# Patient Record
Sex: MALE | Race: OTHER
[De-identification: names, ages, dates, MRNs, and addresses within clinical notes are randomized per-mention and may not be internally consistent; named-entity substitution may affect disease eponyms.]

---

## 2019-12-06 ENCOUNTER — HOSPITAL ENCOUNTER (INPATIENT)
Dept: HOSPITAL 15 - ER | Age: 24
LOS: 1 days | Discharge: HOME | DRG: 245 | End: 2019-12-07
Attending: INTERNAL MEDICINE | Admitting: INTERNAL MEDICINE
Payer: MEDICAID

## 2019-12-06 VITALS — DIASTOLIC BLOOD PRESSURE: 59 MMHG | SYSTOLIC BLOOD PRESSURE: 94 MMHG

## 2019-12-06 VITALS — WEIGHT: 126.37 LBS | BODY MASS INDEX: 18.72 KG/M2 | HEIGHT: 69 IN

## 2019-12-06 DIAGNOSIS — D64.9: ICD-10-CM

## 2019-12-06 DIAGNOSIS — K51.911: Primary | ICD-10-CM

## 2019-12-06 DIAGNOSIS — Z83.3: ICD-10-CM

## 2019-12-06 DIAGNOSIS — E44.0: ICD-10-CM

## 2019-12-06 LAB
ALBUMIN SERPL-MCNC: 2.9 G/DL (ref 3.4–5)
ALP SERPL-CCNC: 76 U/L (ref 45–117)
ALT SERPL-CCNC: 11 U/L (ref 16–61)
ANION GAP SERPL CALCULATED.3IONS-SCNC: 5 MMOL/L (ref 5–15)
APTT PPP: 31.7 SEC (ref 23.64–32.05)
BILIRUB SERPL-MCNC: 0.3 MG/DL (ref 0.2–1)
BUN SERPL-MCNC: 11 MG/DL (ref 7–18)
BUN/CREAT SERPL: 12.5
CALCIUM SERPL-MCNC: 8.5 MG/DL (ref 8.5–10.1)
CHLORIDE SERPL-SCNC: 108 MMOL/L (ref 98–107)
CO2 SERPL-SCNC: 27 MMOL/L (ref 21–32)
GLUCOSE SERPL-MCNC: 88 MG/DL (ref 74–106)
HCT VFR BLD AUTO: 33.8 % (ref 41–53)
HGB BLD-MCNC: 10.6 G/DL (ref 13.5–17.5)
INR PPP: 1.19 (ref 0.9–1.15)
MCH RBC QN AUTO: 21.2 PG (ref 28–32)
MCV RBC AUTO: 67.3 FL (ref 80–100)
NRBC BLD QL AUTO: 0 %
POTASSIUM SERPL-SCNC: 4.6 MMOL/L (ref 3.5–5.1)
PROT SERPL-MCNC: 9.3 G/DL (ref 6.4–8.2)
SODIUM SERPL-SCNC: 140 MMOL/L (ref 136–145)

## 2019-12-06 PROCEDURE — 80053 COMPREHEN METABOLIC PANEL: CPT

## 2019-12-06 PROCEDURE — 86901 BLOOD TYPING SEROLOGIC RH(D): CPT

## 2019-12-06 PROCEDURE — 96375 TX/PRO/DX INJ NEW DRUG ADDON: CPT

## 2019-12-06 PROCEDURE — 36415 COLL VENOUS BLD VENIPUNCTURE: CPT

## 2019-12-06 PROCEDURE — 86900 BLOOD TYPING SEROLOGIC ABO: CPT

## 2019-12-06 PROCEDURE — 85610 PROTHROMBIN TIME: CPT

## 2019-12-06 PROCEDURE — 85018 HEMOGLOBIN: CPT

## 2019-12-06 PROCEDURE — 86850 RBC ANTIBODY SCREEN: CPT

## 2019-12-06 PROCEDURE — 99291 CRITICAL CARE FIRST HOUR: CPT

## 2019-12-06 PROCEDURE — 83605 ASSAY OF LACTIC ACID: CPT

## 2019-12-06 PROCEDURE — 85025 COMPLETE CBC W/AUTO DIFF WBC: CPT

## 2019-12-06 PROCEDURE — 74176 CT ABD & PELVIS W/O CONTRAST: CPT

## 2019-12-06 PROCEDURE — 85014 HEMATOCRIT: CPT

## 2019-12-06 PROCEDURE — 85045 AUTOMATED RETICULOCYTE COUNT: CPT

## 2019-12-06 PROCEDURE — 96361 HYDRATE IV INFUSION ADD-ON: CPT

## 2019-12-06 PROCEDURE — 85652 RBC SED RATE AUTOMATED: CPT

## 2019-12-06 PROCEDURE — 85730 THROMBOPLASTIN TIME PARTIAL: CPT

## 2019-12-06 PROCEDURE — 87040 BLOOD CULTURE FOR BACTERIA: CPT

## 2019-12-06 PROCEDURE — 96374 THER/PROPH/DIAG INJ IV PUSH: CPT

## 2019-12-06 RX ADMIN — HYDROCODONE BITARTRATE AND ACETAMINOPHEN PRN TAB: 10; 325 TABLET ORAL at 23:48

## 2019-12-06 RX ADMIN — FAMOTIDINE SCH MG: 20 TABLET, FILM COATED ORAL at 22:00

## 2019-12-06 RX ADMIN — METHYLPREDNISOLONE SODIUM SUCCINATE SCH MG: 40 INJECTION, POWDER, FOR SOLUTION INTRAMUSCULAR; INTRAVENOUS at 23:47

## 2019-12-06 RX ADMIN — SODIUM CHLORIDE SCH MLS/HR: 0.9 INJECTION, SOLUTION INTRAVENOUS at 18:45

## 2019-12-06 NOTE — NUR
Telemetry admit from [facility] admitted to Telemetry unit after SBAR received.  Patient oriented to BLAKE SOLITARIO, RN primary RN, [facility] unit, room 273, bed A, and unit policies regarding patient 
care and visiting hours. Patient now on continuous telemetry monitoring, tele box #[id number]  and 
telemetry reading on arrival to unit is Sinus Bradycardia @ 50. Patient weighed by bed scale 
and encouraged to call if they need something. All questions and concerns addressed, patient 
verbalized understanding. Note: Patient received Awake and alert. He denies abdominal pain 
and nausea and vomiting. Abdomen soft and non-distended. Bed in low position and call light 
in reach.

## 2019-12-07 VITALS — DIASTOLIC BLOOD PRESSURE: 46 MMHG | SYSTOLIC BLOOD PRESSURE: 96 MMHG

## 2019-12-07 VITALS — DIASTOLIC BLOOD PRESSURE: 57 MMHG | SYSTOLIC BLOOD PRESSURE: 102 MMHG

## 2019-12-07 VITALS — SYSTOLIC BLOOD PRESSURE: 109 MMHG | DIASTOLIC BLOOD PRESSURE: 73 MMHG

## 2019-12-07 LAB
HCT VFR BLD AUTO: 28.4 % (ref 41–53)
HCT VFR BLD AUTO: 28.9 % (ref 41–53)
HCT VFR BLD AUTO: 29.5 % (ref 41–53)
HGB BLD-MCNC: 9 G/DL (ref 13.5–17.5)
HGB BLD-MCNC: 9 G/DL (ref 13.5–17.5)
HGB BLD-MCNC: 9.3 G/DL (ref 13.5–17.5)
MCH RBC QN AUTO: 21.4 PG (ref 28–32)
MCV RBC AUTO: 67.6 FL (ref 80–100)
NRBC BLD QL AUTO: 0 %

## 2019-12-07 RX ADMIN — METHYLPREDNISOLONE SODIUM SUCCINATE SCH MG: 40 INJECTION, POWDER, FOR SOLUTION INTRAMUSCULAR; INTRAVENOUS at 14:33

## 2019-12-07 RX ADMIN — HYDROCODONE BITARTRATE AND ACETAMINOPHEN PRN TAB: 10; 325 TABLET ORAL at 07:09

## 2019-12-07 RX ADMIN — SODIUM CHLORIDE SCH MLS/HR: 0.9 INJECTION, SOLUTION INTRAVENOUS at 02:15

## 2019-12-07 RX ADMIN — METHYLPREDNISOLONE SODIUM SUCCINATE SCH MG: 40 INJECTION, POWDER, FOR SOLUTION INTRAMUSCULAR; INTRAVENOUS at 06:29

## 2019-12-07 RX ADMIN — SODIUM CHLORIDE SCH MLS/HR: 0.9 INJECTION, SOLUTION INTRAVENOUS at 11:49

## 2019-12-07 RX ADMIN — HYDROCODONE BITARTRATE AND ACETAMINOPHEN PRN TAB: 10; 325 TABLET ORAL at 15:59

## 2019-12-07 RX ADMIN — FAMOTIDINE SCH MG: 20 TABLET, FILM COATED ORAL at 10:06

## 2019-12-07 NOTE — NUR
Discharge instructions given as ordered. Encourage to follow up with PMD as instructed. All 
questions and concerns addressed. Patient verbalized understanding.  Medication 
reconciliation form completed and copy given to patient.IV removed with catheter intact, 
pressure dressing applied. Telemetry unit returned to ICU. Patient taken to vehicle via 
wheelchair with all personal belongings, accompanied by staff and family member. No distress 
noted at time of departure.